# Patient Record
Sex: MALE | Race: WHITE | NOT HISPANIC OR LATINO | Employment: FULL TIME | URBAN - METROPOLITAN AREA
[De-identification: names, ages, dates, MRNs, and addresses within clinical notes are randomized per-mention and may not be internally consistent; named-entity substitution may affect disease eponyms.]

---

## 2023-09-15 ENCOUNTER — HOSPITAL ENCOUNTER (OUTPATIENT)
Dept: DATA CONVERSION | Facility: HOSPITAL | Age: 53
End: 2023-09-15
Attending: OTOLARYNGOLOGY | Admitting: OTOLARYNGOLOGY
Payer: COMMERCIAL

## 2023-09-15 DIAGNOSIS — J32.4 CHRONIC PANSINUSITIS: ICD-10-CM

## 2023-09-15 DIAGNOSIS — J34.2 DEVIATED NASAL SEPTUM: ICD-10-CM

## 2023-09-15 DIAGNOSIS — H65.23 CHRONIC SEROUS OTITIS MEDIA, BILATERAL: ICD-10-CM

## 2023-09-15 DIAGNOSIS — J34.3 HYPERTROPHY OF NASAL TURBINATES: ICD-10-CM

## 2023-09-15 DIAGNOSIS — J33.9 NASAL POLYP, UNSPECIFIED: ICD-10-CM

## 2023-09-29 VITALS — BODY MASS INDEX: 24.54 KG/M2 | WEIGHT: 175.27 LBS | HEIGHT: 71 IN

## 2023-09-29 LAB
COMPLETE PATHOLOGY REPORT: NORMAL
CONVERTED CLINICAL DIAGNOSIS-HISTORY: NORMAL
CONVERTED FINAL DIAGNOSIS: NORMAL
CONVERTED FINAL REPORT PDF LINK TO COPY AND PASTE: NORMAL
CONVERTED GROSS DESCRIPTION: NORMAL
CONVERTED MICROSCOPIC DESCRIPTION: NORMAL

## 2023-09-30 NOTE — H&P
History & Physical Reviewed:   I have reviewed the History and Physical dated:  29-Aug-2023   History and Physical reviewed and relevant findings noted. Patient examined to review pertinent physical  findings.: No significant changes   Home Medications Reviewed: no changes noted   Allergies Reviewed: no changes noted       ERAS (Enhanced Recovery After Surgery):  ·  ERAS Patient: no     Consent:   COVID-19 Consent:  ·  COVID-19 Risk Consent Surgeon has reviewed key risks related to the risk of josé COVID-19 and if they contract COVID-19 what the risks are.       Electronic Signatures:  Esau Apodaca)  (Signed 15-Sep-2023 13:10)   Authored: History & Physical Reviewed, ERAS, Consent,  Note Completion      Last Updated: 15-Sep-2023 13:10 by Esau Apodaca)

## 2023-10-01 NOTE — OP NOTE
Post Operative Note:     PreOp Diagnosis: Chronic frontal and ethmoid and  maxillary sinusitis and polyps of the sinuses and deviation of the septum and hypertrophy of the inferior turbinates and bilateral chronic serous otitis media   Post-Procedure Diagnosis: same   Procedure: 1.  bilateral endoscopic total ethmoidectomy  with frontal exploration  2.  bilateral endoscopic maxillary antrostomies with tissue removal  3.  stereotactic extracranial CT guidance (ubigrate)  4.  septoplasty  5. bilateral submucous resection of inferior turbinates  6.  bilateral myringotomies with insertion of PE tubes   Surgeon: Esau Apodaca   Resident/Fellow/Other Assistant: none   Anesthesia: General endotracheal   Estimated Blood Loss (mL): 75 mL   Specimen: yes   Complications: none   Findings: polyps and obstruction of the sinuses and  deviation of the septum to the right side superiorly into the left side inferiorly     Operative Report Dictated:  Dictation: not applicable - note contains Operative  Report   Operative Report:     patient was taken to the operating room placed in supine position.  General endotracheal anesthesia was administered by anesthesia service.  The right ear was examined  under the microscope and a myringotomy was performed in the inferior anterior quadrant.  A router bobbin PE tube was inserted without difficulty.  A similar procedure was performed on the left side.  Clear serous fluid was suctioned from the middle ears  bilaterally.  Attention was then turned to the nasal cavity and the nasal cavity was decongested with topical Afrin.  The stereotactic guidance system was placed on the patient's forehead and was calibrated to the tip of the microdebrider blade.  1% lidocaine  with 1/100,000 epinephrine was injected into the base of the middle turbinates and the uncinate processes bilaterally endoscopically.  The left middle turbinate was infractured and the uncinate process was pulled medially  with a probe.  Using a curved  microdebrider blade the uncinate process was resected.  The maxillary ostium was noted to be stenotic and was widened in the posterior and inferior direction.  The polyps along the floor the maxillary sinus were resected using the microdebrider blade  under stereotactic guidance.  The medial and inferior walls of the ethmoid bulla were resected and the anterior ethmoid sinus was entered.  The polyps within the anterior ethmoid air cells were resected and cleared and they were followed posteriorly into  the posterior ethmoid sinus and the air cells of the posterior ethmoid sinus were opened under stereotactic guidance.  The polyps were followed anteriorly to the frontoethmoid recess and the air cells of the frontoethmoid recess were opened until the  patency of the frontal sinus duct could be verified under stereotactic guidance.  Hemostasis was achieved using sinus foam and a similar procedure was performed on the right side.  Attention was then turned to the nasal septum.  1% lidocaine with 1/100,000  of epinephrine was injected into the mucoperichondrium of the septum bilaterally.  A left hemitransfixion incision was performed and the left mucoperichondrium of the septum was elevated.  The right side of the septal cartilage was then dissected while  preserving 1 cm of caudal and dorsal septum.  The subluxed portion of the septum inferiorly was resected using a [place].  A large piece of septal cartilage was then harvested using a swivel knife.  The deviated bony septum posteriorly and superiorly was  resected using a double-action forceps.  Inferior tunnels were developed and the deviated maxillary crest was resected inferiorly using a chisel and mallet.  The harvested piece of cartilage was then thinned and reinserted at the midline between the mucoperichondrial  flaps.  The incision was closed using 4-0 chromic sutures.  Silastic splints with Bactroban were placed on both sides of  the septum and were immobilized using a 2-0 Prolene suture.  The left inferior turbinate was infractured and subsequently outfractured.   A stab incision was performed at the tip of the turbinate and a 2.9 mm microdebrider blade was inserted submucosally and the anterior 1.5 cm of the turbinate bone was submucosally resected into the desired shape and size.  Hemostasis was achieved using  the insulated bipolar cautery at the tip of the turbinate and along the length of the turbinate at 15 W.  A similar procedure was performed on the right side.  Bilateral Merocel 2000 packings with Bactroban were inserted for hemostasis.  The oral cavity  was irrigated and suctioned and there was no evidence of bleeding noted and the patient was awakened and extubated and returned to recovery room in stable condition and there were no complications.      Attestation:   Note Completion:  Attending Attestation I performed the procedure without a resident         Electronic Signatures:  Esau Apodaca)  (Signed 15-Sep-2023 15:33)   Authored: Post Operative Note, Note Completion      Last Updated: 15-Sep-2023 15:33 by Esau Apodaca)

## 2024-04-10 ENCOUNTER — OFFICE VISIT (OUTPATIENT)
Dept: PRIMARY CARE | Facility: CLINIC | Age: 54
End: 2024-04-10
Payer: COMMERCIAL

## 2024-04-10 ENCOUNTER — HOSPITAL ENCOUNTER (OUTPATIENT)
Dept: RADIOLOGY | Facility: CLINIC | Age: 54
Discharge: HOME | End: 2024-04-10
Payer: COMMERCIAL

## 2024-04-10 VITALS
OXYGEN SATURATION: 97 % | HEART RATE: 65 BPM | DIASTOLIC BLOOD PRESSURE: 83 MMHG | SYSTOLIC BLOOD PRESSURE: 125 MMHG | BODY MASS INDEX: 25.87 KG/M2 | WEIGHT: 185 LBS

## 2024-04-10 DIAGNOSIS — M25.512 ACUTE PAIN OF LEFT SHOULDER: Primary | ICD-10-CM

## 2024-04-10 DIAGNOSIS — Z91.030 BEE STING ALLERGY: ICD-10-CM

## 2024-04-10 DIAGNOSIS — M25.512 ACUTE PAIN OF LEFT SHOULDER: ICD-10-CM

## 2024-04-10 PROCEDURE — 99213 OFFICE O/P EST LOW 20 MIN: CPT

## 2024-04-10 PROCEDURE — 73030 X-RAY EXAM OF SHOULDER: CPT | Mod: LT

## 2024-04-10 PROCEDURE — 73030 X-RAY EXAM OF SHOULDER: CPT | Mod: LEFT SIDE | Performed by: RADIOLOGY

## 2024-04-10 RX ORDER — EPINEPHRINE 0.3 MG/.3ML
0.3 INJECTION SUBCUTANEOUS
COMMUNITY
Start: 2020-08-06 | End: 2024-04-10 | Stop reason: SDUPTHER

## 2024-04-10 RX ORDER — EPINEPHRINE 0.3 MG/.3ML
0.3 INJECTION SUBCUTANEOUS ONCE AS NEEDED
Qty: 1 EACH | Refills: 1 | Status: SHIPPED | OUTPATIENT
Start: 2024-04-10

## 2024-04-10 ASSESSMENT — PATIENT HEALTH QUESTIONNAIRE - PHQ9
1. LITTLE INTEREST OR PLEASURE IN DOING THINGS: NOT AT ALL
2. FEELING DOWN, DEPRESSED OR HOPELESS: NOT AT ALL
SUM OF ALL RESPONSES TO PHQ9 QUESTIONS 1 AND 2: 0

## 2024-04-10 NOTE — PATIENT INSTRUCTIONS
Xray of the shoulder today  Try the home exercises 3 weeks   If no improvement - then call and we can put in the MRI order

## 2024-04-10 NOTE — PROGRESS NOTES
"Subjective   Patient ID: Cal Monaco is a 54 y.o. male who presents for LT shoulder pain (Pain for awhile, per pt no injury, couple finger are tingly).  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 No pertinent surgical history.   Past Medical History:   Diagnosis Date    Encounter for screening for diabetes mellitus 03/24/2016    Diabetes mellitus screening    Encounter for screening for diseases of the blood and blood-forming organs and certain disorders involving the immune mechanism 03/24/2016    Screening for deficiency anemia    Encounter for screening for lipoid disorders 03/24/2016    Screening, lipid     Social History     Tobacco Use    Smoking status: Every Day     Types: Cigarettes    Smokeless tobacco: Never        Review of Systems  10 point review of systems performed and is negative except as noted in the HPI.      Current Outpatient Medications:     EPINEPHrine 0.3 mg/0.3 mL injection syringe, Inject 0.3 mL (0.3 mg) into the muscle 1 time if needed for anaphylaxis for up to 2 doses. As Directed, Disp: 1 each, Rfl: 1     Objective   /83   Pulse 65   Wt 83.9 kg (185 lb)   SpO2 97%   BMI 25.87 kg/m²     Physical Exam  Constitutional:       Appearance: Normal appearance.   HENT:      Head: Normocephalic and atraumatic.   Eyes:      Conjunctiva/sclera: Conjunctivae normal.   Cardiovascular:      Rate and " Rhythm: Normal rate and regular rhythm.      Heart sounds: Normal heart sounds. No murmur heard.  Pulmonary:      Effort: Pulmonary effort is normal.      Breath sounds: Normal breath sounds.   Musculoskeletal:      Left shoulder: Tenderness present. No swelling, deformity or effusion. Normal range of motion.      Left upper arm: Normal. No tenderness.      Left hand: Normal. No swelling or tenderness. Normal range of motion. Normal strength. Normal pulse.      Cervical back: No tenderness. No pain with movement. Normal range of motion.      Comments: Negative neer, park, drop arm, apprehension, empty can, yergason. Pain with abduction and adduction. Pain with flexion. Extension WNL.    Skin:     General: Skin is warm and dry.   Neurological:      Mental Status: He is alert and oriented to person, place, and time.         Assessment/Plan   Problem List Items Addressed This Visit    None  Visit Diagnoses       Acute pain of left shoulder    -  Primary    Relevant Orders    XR shoulder left 2+ views (Completed)    Bee sting allergy        Relevant Medications    EPINEPHrine 0.3 mg/0.3 mL injection syringe          Pain of L shoulder - tendonitis vs OA vs partial tear of rotator cuff vs shoulder impingement   Xray of shoulder- Final result for imaging is still pending from Radiology.   Try home exercises - handout given   Discussed PT  Consider MRI if no improvement     Discussed at visit any disease processes that were of concern as well as the risks, benefits and instructions on any new medication provided. Patient (and/or caretaker of patient if present) stated all questions were answered, and they voiced understanding of instructions.

## 2024-04-13 PROBLEM — J34.2 DEVIATED NASAL SEPTUM: Status: ACTIVE | Noted: 2023-02-28

## 2024-04-13 PROBLEM — F17.200 TOBACCO DEPENDENCE SYNDROME: Status: ACTIVE | Noted: 2024-01-10

## 2024-04-13 PROBLEM — J30.9 ALLERGIC RHINITIS: Status: ACTIVE | Noted: 2024-01-10

## 2024-04-13 PROBLEM — M25.511 BILATERAL SHOULDER PAIN: Status: ACTIVE | Noted: 2024-04-13

## 2024-04-13 PROBLEM — M25.512 BILATERAL SHOULDER PAIN: Status: ACTIVE | Noted: 2024-04-13

## 2025-03-28 ENCOUNTER — OFFICE VISIT (OUTPATIENT)
Dept: PRIMARY CARE | Facility: CLINIC | Age: 55
End: 2025-03-28
Payer: COMMERCIAL

## 2025-03-28 VITALS
BODY MASS INDEX: 26.15 KG/M2 | DIASTOLIC BLOOD PRESSURE: 80 MMHG | WEIGHT: 187 LBS | OXYGEN SATURATION: 96 % | HEART RATE: 67 BPM | SYSTOLIC BLOOD PRESSURE: 146 MMHG

## 2025-03-28 DIAGNOSIS — G89.29 CHRONIC LEFT SHOULDER PAIN: Primary | ICD-10-CM

## 2025-03-28 DIAGNOSIS — M25.512 CHRONIC LEFT SHOULDER PAIN: Primary | ICD-10-CM

## 2025-03-28 PROCEDURE — 99213 OFFICE O/P EST LOW 20 MIN: CPT

## 2025-03-28 NOTE — PROGRESS NOTES
Subjective   Patient ID: Cal Monaco is a 55 y.o. male who presents for Shoulder Pain (Left shoulder pain on and off for 2 years, the pain has been getting worse and now the pain is going up to his neck and down into his fingers, says the pain never goes away ).  HPI  Cal presents for L shoulder pain   -it is getting worse   -he will now have pain that radiates up into his neck  -it will shoot down his L arm  -4th and 5th fingers will go numb  -it will go away quickly or will last 2 hours  -now it is 2 weeks and not going away   -takes tylenol for arthrtiis and that helps some but can still feel it   -heavy lifting at work 5 days a week   -R handed   -if he is sleeping on his L shoulder it wakes him up and L arm is numb  -if he is driving those th and 5th fingers will go numb  -the edge of his shoulder blade on the L side will hurt and goes up into neck  -no headache  -usually the pain is going down the L arm  -when waking up will feel sore in the L shoulder   -he has been doing PT home exercises do keep it loose, ROM is okay   -no swelling       No past surgical history on file.   Past Medical History:   Diagnosis Date    Encounter for screening for diabetes mellitus 03/24/2016    Diabetes mellitus screening    Encounter for screening for diseases of the blood and blood-forming organs and certain disorders involving the immune mechanism 03/24/2016    Screening for deficiency anemia    Encounter for screening for lipoid disorders 03/24/2016    Screening, lipid     Social History     Tobacco Use    Smoking status: Every Day     Types: Cigarettes    Smokeless tobacco: Never   Substance Use Topics    Alcohol use: Never    Drug use: Never        Review of Systems  10 point review of systems performed and is negative except as noted in the HPI.      Current Outpatient Medications:     EPINEPHrine 0.3 mg/0.3 mL injection syringe, Inject 0.3 mL (0.3 mg) into the muscle 1 time if needed for anaphylaxis for up to 2  doses. As Directed, Disp: 1 each, Rfl: 1     Objective   /80   Pulse 67   Wt 84.8 kg (187 lb)   SpO2 96%   BMI 26.15 kg/m²     Physical Exam  Constitutional:       Appearance: Normal appearance.   HENT:      Head: Normocephalic and atraumatic.   Eyes:      Conjunctiva/sclera: Conjunctivae normal.   Cardiovascular:      Rate and Rhythm: Normal rate and regular rhythm.      Heart sounds: Normal heart sounds. No murmur heard.  Pulmonary:      Effort: Pulmonary effort is normal.      Breath sounds: Normal breath sounds.   Musculoskeletal:      Left shoulder: Tenderness (posterior shoulder) present. No swelling, deformity or effusion. Normal range of motion.      Left upper arm: Normal. No tenderness.      Left hand: Normal. No swelling. Normal pulse.      Cervical back: No tenderness. No pain with movement. Normal range of motion.      Comments: Negative neer, park, drop arm, apprehension, empty can, yergason. Pain with adduction. Pain with flexion. Extension WNL.    Skin:     General: Skin is warm and dry.   Neurological:      Mental Status: He is alert and oriented to person, place, and time.         Assessment & Plan  Chronic left shoulder pain  I first saw Cal for the shoulder pain 4/10/24, it has only gotten worse since then   Xray at that time showed - mild acromioclavicular osteoarthritis   He was given home PT exercises 4/10/24 and has been doing them since, increasing in frequency these last 2 weeks   Minimal relief with arthritis tylenol   Discussed due to recent worsening, I recommend MRI for further evaluation, consider impingement due to numbness   May need EMG to eval for numbness in 4th and 5th fingers   MRI shoulder ordered   Orders:    MR shoulder left wo IV contrast; Future          Discussed at visit any disease processes that were of concern as well as the risks, benefits and instructions on any new medication provided. Patient (and/or caretaker of patient if present) stated all  questions were answered, and they voiced understanding of instructions.      Isela Arias PA-C

## 2025-04-16 ENCOUNTER — HOSPITAL ENCOUNTER (OUTPATIENT)
Dept: RADIOLOGY | Facility: HOSPITAL | Age: 55
Discharge: HOME | End: 2025-04-16
Payer: COMMERCIAL

## 2025-04-16 DIAGNOSIS — M25.512 CHRONIC LEFT SHOULDER PAIN: ICD-10-CM

## 2025-04-16 DIAGNOSIS — G89.29 CHRONIC LEFT SHOULDER PAIN: ICD-10-CM

## 2025-04-16 PROCEDURE — 73221 MRI JOINT UPR EXTREM W/O DYE: CPT | Mod: LT

## 2025-04-28 DIAGNOSIS — S43.432A TEAR OF LEFT GLENOID LABRUM, INITIAL ENCOUNTER: Primary | ICD-10-CM

## 2025-05-12 ENCOUNTER — APPOINTMENT (OUTPATIENT)
Dept: ORTHOPEDIC SURGERY | Facility: HOSPITAL | Age: 55
End: 2025-05-12
Payer: COMMERCIAL

## 2025-05-12 VITALS — HEIGHT: 71 IN | BODY MASS INDEX: 26.04 KG/M2 | WEIGHT: 186 LBS

## 2025-05-12 DIAGNOSIS — S43.432A TEAR OF LEFT GLENOID LABRUM, INITIAL ENCOUNTER: ICD-10-CM

## 2025-05-12 DIAGNOSIS — M54.12 CERVICAL RADICULITIS: ICD-10-CM

## 2025-05-12 PROCEDURE — 99214 OFFICE O/P EST MOD 30 MIN: CPT | Performed by: ORTHOPAEDIC SURGERY

## 2025-05-12 PROCEDURE — 99204 OFFICE O/P NEW MOD 45 MIN: CPT | Performed by: ORTHOPAEDIC SURGERY

## 2025-05-12 PROCEDURE — 3008F BODY MASS INDEX DOCD: CPT | Performed by: ORTHOPAEDIC SURGERY

## 2025-05-12 ASSESSMENT — PAIN SCALES - GENERAL: PAINLEVEL_OUTOF10: 2

## 2025-05-12 ASSESSMENT — PAIN - FUNCTIONAL ASSESSMENT: PAIN_FUNCTIONAL_ASSESSMENT: 0-10

## 2025-05-12 NOTE — PROGRESS NOTES
Here for evaluation of his neck and shoulder he has/scapular pain and trapezial pain has been going on for years.  It is accompanied by numbness and tingling in the ulnar 2 digits.  It is sometimes worse with usage and better with rest.  He was evaluated and an MRI scan revealed a labrum tear.  There is some concern that this may be related to his symptoms.    The patient is pleasant and cooperative.  The patient is alert and oriented ×3.  Auditory function is intact.  The patient is a good historian.  The patient is not in acute distress.  Eye exam significant for nonicteric sclera, intact ocular muscle movement.  Breathing is rhythmic symmetric and nonlabored.  Left radial pulse palpable brisk capillary refill light touch sensations exam significant for numbness and tingling in the ulnar 2 digits of the left hand.  Intrinsic muscle function is normal.  Patient has full range of motion of his shoulder with slight pain at the extremes of range of motion felt primarily in the trapezius.  Brooke's test is negative impingement tests are negative no drop arm.  Cervical range of motion is slightly limited on rotation to the left side which does reproduce her symptoms into the left trapezius.    X-rays and MRI scan available for review the MRI scan does show likely tear of the superior labrum extending posteriorly intact rotator cuff, arthritis of the acromioclavicular joint.    Left shoulder pain I believe the patient's pain is from cervical origin.  Likely cervical radiculitis.  We discussed differential diagnosis, as well as the rate of positive findings in normal individuals for labrum pathology and acromioclavicular arthritis.  I do not think of labral pathology as a source of his pain.  It is unlikely that the labrum require surgical intervention.  I do recommend further evaluation and formal consultation with one of my specialist and spine problems.  We will arrange for this as soon as possible.    This was  dictated using voice recognition software and not corrected for grammatical or spelling errors.

## 2025-05-19 ENCOUNTER — OFFICE VISIT (OUTPATIENT)
Dept: ORTHOPEDIC SURGERY | Facility: CLINIC | Age: 55
End: 2025-05-19
Payer: COMMERCIAL

## 2025-05-19 ENCOUNTER — HOSPITAL ENCOUNTER (OUTPATIENT)
Dept: RADIOLOGY | Facility: CLINIC | Age: 55
Discharge: HOME | End: 2025-05-19
Payer: COMMERCIAL

## 2025-05-19 DIAGNOSIS — M54.12 CERVICAL RADICULOPATHY: Primary | ICD-10-CM

## 2025-05-19 DIAGNOSIS — S43.432D TEAR OF LEFT GLENOID LABRUM, SUBSEQUENT ENCOUNTER: ICD-10-CM

## 2025-05-19 DIAGNOSIS — M54.12 CERVICAL RADICULOPATHY: ICD-10-CM

## 2025-05-19 PROCEDURE — 99213 OFFICE O/P EST LOW 20 MIN: CPT | Performed by: PHYSICIAN ASSISTANT

## 2025-05-19 PROCEDURE — 72050 X-RAY EXAM NECK SPINE 4/5VWS: CPT | Performed by: RADIOLOGY

## 2025-05-19 PROCEDURE — 72050 X-RAY EXAM NECK SPINE 4/5VWS: CPT

## 2025-05-19 RX ORDER — PREDNISONE 10 MG/1
TABLET ORAL
Qty: 24 TABLET | Refills: 0 | Status: SHIPPED | OUTPATIENT
Start: 2025-05-19 | End: 2025-05-26

## 2025-05-19 NOTE — PROGRESS NOTES
HPI:  Cal Monaco is a 55 y.o. male who presents to the spine clinic today for evaluation of LUE radiculopathy.     No injury or trauma. Reports pain in the left shoulder/triceps with associated neck stiffness/soreness for approx 1 year. New onset numbness/tingling down the LUE into the 4th/5th digits for 1-2 months.   No UE weakness. No issues with walking/balance/coordination.     Has and MRI of the left shoulder performed and evaluated by Dr. Bishop. L labral tear noted however patient's symptoms more likely radicular and he was referred here for evaluation.     No PT or injections in the spine. No medication at this time.     ROS:  Reviewed on EMR and patient intake sheet.    PMH/SH:  Reviewed on EMR and patient intake sheet.    Exam:  Physical Exam  Spine Musculoskeletal Exam    Well appearing, NAD  Pleasant & cooperative  BMI 25.94  Decreased ROM cervical spine with rotation, flexion/extension  No paraspinal muscle spasms  upper extremity sensation intact to light touch  upper extremity motor 5/5 throughout; no focal motor weakness  normal gait & station; assistive devices: none  SLR: n/a  Reflexes: hypo; neg hoffmans bilaterally  L shoulder NTTP; neg Neer's    Radiology:     Xrays cervical spine done in the office today 05/19/25 personally reviewed, along with the accompanying report when available.   Straightening of the normal cervical lordosis. No scoliosis or fractures. Mild disc degeneration C5-6, C6-7. No abnormal motion on flex/ext      Assessment and Plan:  1. Cervical radiculopathy (Primary)  2. Tear of left glenoid labrum, subsequent encounter  - XR cervical spine complete 4-5 views; Future  - Referral to Physical Therapy; Future  - predniSONE (Deltasone) 10 mg tablet; Take 5 tablets (50 mg) by mouth once daily for 2 days, THEN 4 tablets (40 mg) once daily for 2 days, THEN 3 tablets (30 mg) once daily for 1 day, THEN 2 tablets (20 mg) once daily for 1 day, THEN 1 tablet (10 mg) once daily for 1  day.  Dispense: 24 tablet; Refill: 0    I reviewed the imaging studies with Cal Monaco in detail today. Patient was seen today for discussion and evaluation of radicular symptoms that have not improved on their own. We discussed conservative management and the patient was referred to PT today. He would benefit from cervical traction in addition to strengthening & ROM exercises.     I recommended a Prednisone taper today to calm down acute radicular pain symptoms. I recommended OTC Tylenol/NSAIDs for pain relief after finishing the steroid taper. Patient was recommended to follow up in 4-6 weeks if their symptoms do not improve, or sooner if symptoms suddenly worsen and they can not complete PT. If symptoms are not improved by next visit, we will discuss MRI imaging to further investigate source of the patient’s pain and discuss next steps.    Patient in agreement to plan of care. Of course Calto Monaco is welcome to call at anytime with questions or concerns.     Ruby Zaidi PA-C  Department of Orthopaedic Surgery    [address] [place] [address]    Voicemail: [phone]   Appts: [phone]  Fax: [phone]